# Patient Record
Sex: MALE | Race: BLACK OR AFRICAN AMERICAN | NOT HISPANIC OR LATINO | Employment: OTHER | URBAN - METROPOLITAN AREA
[De-identification: names, ages, dates, MRNs, and addresses within clinical notes are randomized per-mention and may not be internally consistent; named-entity substitution may affect disease eponyms.]

---

## 2020-03-02 PROBLEM — E78.5 DYSLIPIDEMIA: Status: ACTIVE | Noted: 2020-03-02

## 2020-11-09 PROBLEM — Z12.11 SCREEN FOR COLON CANCER: Status: ACTIVE | Noted: 2018-09-12

## 2021-05-10 PROBLEM — R41.89 COGNITIVE IMPAIRMENT: Status: ACTIVE | Noted: 2021-05-10

## 2022-08-09 ENCOUNTER — PATIENT OUTREACH (OUTPATIENT)
Dept: ADMINISTRATIVE | Facility: HOSPITAL | Age: 72
End: 2022-08-09

## 2022-08-09 NOTE — PROGRESS NOTES
Health Maintenance Due   Topic Date Due    Foot Exam  Never done    Shingles Vaccine (1 of 2) Never done    COVID-19 Vaccine (4 - Booster for Pfizer series) 01/28/2022   chart review  Attempted to call pt but pt was unreachable and has no portal.

## 2024-04-22 ENCOUNTER — PATIENT OUTREACH (OUTPATIENT)
Dept: ADMINISTRATIVE | Facility: HOSPITAL | Age: 74
End: 2024-04-22

## 2024-04-22 DIAGNOSIS — Z79.4 DIABETES MELLITUS DUE TO UNDERLYING CONDITION WITH KETOACIDOTIC COMA, WITH LONG-TERM CURRENT USE OF INSULIN: ICD-10-CM

## 2024-04-22 DIAGNOSIS — R73.03 BORDERLINE DIABETES MELLITUS: Primary | ICD-10-CM

## 2024-04-22 DIAGNOSIS — E08.11 DIABETES MELLITUS DUE TO UNDERLYING CONDITION WITH KETOACIDOTIC COMA, WITH LONG-TERM CURRENT USE OF INSULIN: ICD-10-CM

## 2024-12-10 PROBLEM — K42.9 UMBILICAL HERNIA WITHOUT OBSTRUCTION AND WITHOUT GANGRENE: Status: ACTIVE | Noted: 2024-12-10

## 2024-12-10 PROBLEM — K40.90 INGUINAL HERNIA OF RIGHT SIDE WITHOUT OBSTRUCTION OR GANGRENE: Status: ACTIVE | Noted: 2024-12-10

## 2025-01-15 PROBLEM — R79.89 TROPONIN LEVEL ELEVATED: Status: ACTIVE | Noted: 2025-01-15

## 2025-01-15 PROBLEM — R94.31 ABNORMAL EKG: Status: ACTIVE | Noted: 2025-01-15

## 2025-01-15 PROBLEM — J38.5 LARYNGOSPASM: Status: ACTIVE | Noted: 2025-01-15

## 2025-01-15 PROBLEM — J96.01 ACUTE HYPOXEMIC RESPIRATORY FAILURE: Status: ACTIVE | Noted: 2025-01-15

## 2025-01-15 PROBLEM — E87.20 LACTIC ACIDOSIS: Status: ACTIVE | Noted: 2025-01-15

## 2025-01-17 PROBLEM — I25.10 CAD (CORONARY ARTERY DISEASE): Status: ACTIVE | Noted: 2025-01-17

## 2025-01-19 PROBLEM — J69.0 ASPIRATION PNEUMONIA: Status: ACTIVE | Noted: 2025-01-19

## 2025-03-17 PROBLEM — L60.3 DYSTROPHIC NAIL: Status: ACTIVE | Noted: 2025-03-17

## 2025-03-17 PROBLEM — L60.3 NAIL DYSTROPHY: Status: ACTIVE | Noted: 2025-03-17

## 2025-07-28 ENCOUNTER — PATIENT OUTREACH (OUTPATIENT)
Dept: ADMINISTRATIVE | Facility: HOSPITAL | Age: 75
End: 2025-07-28

## 2025-07-28 DIAGNOSIS — R73.03 BORDERLINE DIABETES MELLITUS: Primary | ICD-10-CM
